# Patient Record
Sex: MALE | Race: WHITE | URBAN - METROPOLITAN AREA
[De-identification: names, ages, dates, MRNs, and addresses within clinical notes are randomized per-mention and may not be internally consistent; named-entity substitution may affect disease eponyms.]

---

## 2019-11-25 ENCOUNTER — OP HISTORICAL/CONVERTED ENCOUNTER (OUTPATIENT)
Dept: OTHER | Age: 64
End: 2019-11-25

## 2025-01-02 ENCOUNTER — OFFICE VISIT (OUTPATIENT)
Age: 70
End: 2025-01-02

## 2025-01-02 VITALS
HEART RATE: 60 BPM | DIASTOLIC BLOOD PRESSURE: 70 MMHG | SYSTOLIC BLOOD PRESSURE: 128 MMHG | RESPIRATION RATE: 14 BRPM | HEIGHT: 69 IN | OXYGEN SATURATION: 97 % | BODY MASS INDEX: 26.48 KG/M2 | WEIGHT: 178.8 LBS

## 2025-01-02 DIAGNOSIS — R09.82 POST-NASAL DRAINAGE: Primary | ICD-10-CM

## 2025-01-02 DIAGNOSIS — R09.81 NASAL CONGESTION: ICD-10-CM

## 2025-01-02 DIAGNOSIS — H73.892 RETRACTION OF TYMPANIC MEMBRANE, LEFT: ICD-10-CM

## 2025-01-02 RX ORDER — ATORVASTATIN CALCIUM 40 MG/1
TABLET, FILM COATED ORAL
COMMUNITY

## 2025-01-02 RX ORDER — LISINOPRIL 20 MG/1
TABLET ORAL
COMMUNITY
Start: 2024-06-03

## 2025-01-02 RX ORDER — IPRATROPIUM BROMIDE 21 UG/1
2 SPRAY, METERED NASAL EVERY 12 HOURS
Qty: 30 ML | Refills: 3 | Status: SHIPPED | OUTPATIENT
Start: 2025-01-02

## 2025-01-02 NOTE — PROGRESS NOTES
Otolaryngology-Head and Neck Surgery  New Patient Visit     Patient: Cole Ricketts  YOB: 1955  MRN: [id number]  Date of Service: 1/2/2025    Chief Complaint:   Chief Complaint   Patient presents with    New Patient    Sinus Problem     Sinus infection         History of Present Illness: Cole Ricketts is a 69 y.o. male who presents today for discussion of ongoing sinus issues.     Chronic post nasal drip, ongoing for the last couple of years  Constant, regardless of season, no specific trigger  Worse in the afternoon  Nasal congestion intermittent   Triggers cough in AM     ? Possibly related to norovirus, with persistent nasal drip thereafter    Tried antibiotics  -Augmentin, no improvement  Has tried OTC antihistamines  Uses sinus rinses   Gargles salt water     Has tried Flonase, Nasacort and now more recently olopatadine nasal spray    Past Medical History:  History reviewed. No pertinent past medical history.    Past Surgical History:   History reviewed. No pertinent surgical history.    Medications:   Current Outpatient Medications   Medication Instructions    apixaban (ELIQUIS) 5 mg, Oral    atorvastatin (LIPITOR) 40 MG tablet 40 mg  , TAKE 1 TABLET AT BEDTIME    lisinopril (PRINIVIL;ZESTRIL) 20 MG tablet Oral    Multiple Vitamins-Minerals (ICAPS AREDS 2 PO) Oral, 2 TIMES DAILY       Allergies:   No Known Allergies    Social History:         Family History:  History reviewed. No pertinent family history.    Review of Systems:    Consitutional: denies fever, excessive weight gain or loss.  Eyes: denies diplopia, eye pain.  Integumentary: denies new concerning skin lesions.  Ears, Nose, Mouth, Throat: denies except as per HPI.  Endocrine: denies hot or cold intolerance, increased thirst.  Respiratory: denies cough, hemoptysis, wheezing  Gastrointestinal: denies trouble swallowing, nausea, emesis, regurgitation  Musculoskeletal: denies muscle weakness or wasting  Cardiovascular: denies chest

## 2025-01-20 RX ORDER — IPRATROPIUM BROMIDE 21 UG/1
1 SPRAY, METERED NASAL 2 TIMES DAILY PRN
Qty: 1 EACH | Refills: 1 | Status: SHIPPED | OUTPATIENT
Start: 2025-01-20

## 2025-01-28 DIAGNOSIS — J32.4 CHRONIC PANSINUSITIS: Primary | ICD-10-CM

## 2025-01-28 RX ORDER — DOXYCYCLINE HYCLATE 100 MG
100 TABLET ORAL 2 TIMES DAILY
Qty: 20 TABLET | Refills: 0 | Status: SHIPPED | OUTPATIENT
Start: 2025-01-28 | End: 2025-02-07

## 2025-02-17 ENCOUNTER — OFFICE VISIT (OUTPATIENT)
Age: 70
End: 2025-02-17
Payer: MEDICARE

## 2025-02-17 VITALS
BODY MASS INDEX: 26.4 KG/M2 | RESPIRATION RATE: 16 BRPM | DIASTOLIC BLOOD PRESSURE: 72 MMHG | OXYGEN SATURATION: 98 % | HEIGHT: 69 IN | HEART RATE: 58 BPM | SYSTOLIC BLOOD PRESSURE: 110 MMHG

## 2025-02-17 DIAGNOSIS — R09.82 POST-NASAL DRAINAGE: ICD-10-CM

## 2025-02-17 DIAGNOSIS — R09.82 POST-NASAL DRAINAGE: Primary | ICD-10-CM

## 2025-02-17 PROCEDURE — 1159F MED LIST DOCD IN RCRD: CPT | Performed by: OTOLARYNGOLOGY

## 2025-02-17 PROCEDURE — 1123F ACP DISCUSS/DSCN MKR DOCD: CPT | Performed by: OTOLARYNGOLOGY

## 2025-02-17 PROCEDURE — G8419 CALC BMI OUT NRM PARAM NOF/U: HCPCS | Performed by: OTOLARYNGOLOGY

## 2025-02-17 PROCEDURE — G8427 DOCREV CUR MEDS BY ELIG CLIN: HCPCS | Performed by: OTOLARYNGOLOGY

## 2025-02-17 PROCEDURE — 1036F TOBACCO NON-USER: CPT | Performed by: OTOLARYNGOLOGY

## 2025-02-17 PROCEDURE — 99213 OFFICE O/P EST LOW 20 MIN: CPT | Performed by: OTOLARYNGOLOGY

## 2025-02-17 PROCEDURE — 3017F COLORECTAL CA SCREEN DOC REV: CPT | Performed by: OTOLARYNGOLOGY

## 2025-02-17 NOTE — PROGRESS NOTES
Otolaryngology-Head and Neck Surgery  Follow Up Patient Visit     Patient: Cole Ricketts  YOB: 1955  MRN: [id number]  Date of Service: 2/17/2025    Chief Complaint:   Chief Complaint   Patient presents with    Follow-up     Post-nasal drainage     Interval hx   Recent AVR and pacemaker    Perhaps atrovent helped slightly with nasal drip  Had exacerbation of symptoms - with discoloration, dysosmia, improved after augmentin Rx    History of Present Illness: Cole Ricketts is a 69 y.o. male who presents today for discussion of ongoing sinus issues.     Chronic post nasal drip, ongoing for the last couple of years  Constant, regardless of season, no specific trigger  Worse in the afternoon  Nasal congestion intermittent   Triggers cough in AM     ? Possibly related to norovirus, with persistent nasal drip thereafter    Tried antibiotics  -Augmentin, no improvement  Has tried OTC antihistamines  Uses sinus rinses   Gargles salt water     Has tried Flonase, Nasacort and now more recently olopatadine nasal spray    Past Medical History:  Past Medical History:   Diagnosis Date    Arthritis 6/18/2000    neck, knee, hands, back       Past Surgical History:   History reviewed. No pertinent surgical history.    Medications:   Current Outpatient Medications   Medication Instructions    apixaban (ELIQUIS) 5 mg, Oral    atorvastatin (LIPITOR) 40 MG tablet 40 mg  , TAKE 1 TABLET AT BEDTIME    ipratropium (ATROVENT) 0.03 % nasal spray 1 spray, Nasal, 2 TIMES DAILY PRN    lisinopril (PRINIVIL;ZESTRIL) 20 MG tablet Oral    Multiple Vitamins-Minerals (ICAPS AREDS 2 PO) Oral, 2 TIMES DAILY       Allergies:   No Known Allergies    Social History:   Social History     Tobacco Use    Smoking status: Never    Smokeless tobacco: Never   Substance Use Topics    Alcohol use: Yes     Alcohol/week: 5.0 standard drinks of alcohol     Types: 5 Cans of beer per week    Drug use: Yes     Types: Marijuana (Weed)     Comment: ingest

## 2025-02-19 LAB — SPECIMEN STATUS REPORT: NORMAL

## 2025-02-20 RX ORDER — IPRATROPIUM BROMIDE 21 UG/1
SPRAY, METERED NASAL
Refills: 0 | OUTPATIENT
Start: 2025-02-20

## 2025-02-21 LAB
BACTERIA SPEC RESP CULT: ABNORMAL
BACTERIA SPEC RESP CULT: ABNORMAL

## 2025-02-25 RX ORDER — CEFDINIR 300 MG/1
300 CAPSULE ORAL 2 TIMES DAILY
Qty: 20 CAPSULE | Refills: 0 | Status: SHIPPED | OUTPATIENT
Start: 2025-02-25 | End: 2025-03-07

## 2025-03-27 ENCOUNTER — HOSPITAL ENCOUNTER (OUTPATIENT)
Facility: HOSPITAL | Age: 70
Discharge: HOME | End: 2025-03-30
Attending: OTOLARYNGOLOGY
Payer: MEDICARE

## 2025-03-27 DIAGNOSIS — J32.4 CHRONIC PANSINUSITIS: ICD-10-CM

## 2025-03-27 PROCEDURE — 70486 CT MAXILLOFACIAL W/O DYE: CPT

## 2025-04-15 ENCOUNTER — OFFICE VISIT (OUTPATIENT)
Age: 70
End: 2025-04-15
Payer: MEDICARE

## 2025-04-15 VITALS
HEIGHT: 69 IN | HEART RATE: 60 BPM | OXYGEN SATURATION: 98 % | WEIGHT: 181.8 LBS | SYSTOLIC BLOOD PRESSURE: 112 MMHG | DIASTOLIC BLOOD PRESSURE: 68 MMHG | BODY MASS INDEX: 26.93 KG/M2 | RESPIRATION RATE: 16 BRPM

## 2025-04-15 DIAGNOSIS — R09.82 POSTNASAL DRIP: ICD-10-CM

## 2025-04-15 DIAGNOSIS — J34.2 NASAL SEPTAL DEVIATION: Primary | ICD-10-CM

## 2025-04-15 DIAGNOSIS — J30.9 ALLERGIC RHINITIS, UNSPECIFIED SEASONALITY, UNSPECIFIED TRIGGER: ICD-10-CM

## 2025-04-15 DIAGNOSIS — J32.0 CHRONIC MAXILLARY SINUSITIS: ICD-10-CM

## 2025-04-15 PROCEDURE — 99213 OFFICE O/P EST LOW 20 MIN: CPT | Performed by: OTOLARYNGOLOGY

## 2025-04-15 PROCEDURE — 1036F TOBACCO NON-USER: CPT | Performed by: OTOLARYNGOLOGY

## 2025-04-15 PROCEDURE — G8427 DOCREV CUR MEDS BY ELIG CLIN: HCPCS | Performed by: OTOLARYNGOLOGY

## 2025-04-15 PROCEDURE — 3017F COLORECTAL CA SCREEN DOC REV: CPT | Performed by: OTOLARYNGOLOGY

## 2025-04-15 PROCEDURE — 1159F MED LIST DOCD IN RCRD: CPT | Performed by: OTOLARYNGOLOGY

## 2025-04-15 PROCEDURE — 1123F ACP DISCUSS/DSCN MKR DOCD: CPT | Performed by: OTOLARYNGOLOGY

## 2025-04-15 PROCEDURE — G8419 CALC BMI OUT NRM PARAM NOF/U: HCPCS | Performed by: OTOLARYNGOLOGY

## 2025-04-15 RX ORDER — OLOPATADINE HYDROCHLORIDE AND MOMETASONE FUROATE 25; 665 UG/1; UG/1
SPRAY, METERED NASAL
COMMUNITY
Start: 2024-06-03

## 2025-04-15 NOTE — PROGRESS NOTES
Otolaryngology-Head and Neck Surgery  Follow Up Patient Visit     Patient: Cole Ricketts  YOB: 1955  MRN: [id number]  Date of Service: 4/15/2025    Chief Complaint:   Chief Complaint   Patient presents with    Follow-up    Sinus Problem     CT Results     Interval hx 4/15/2025  Had sinus CT     Interval hx   Recent AVR and pacemaker    Perhaps atrovent helped slightly with nasal drip  Had exacerbation of symptoms - with discoloration, dysosmia, improved after augmentin Rx    History of Present Illness: Cole Ricketts is a 69 y.o. male who presents today for discussion of ongoing sinus issues.     Chronic post nasal drip, ongoing for the last couple of years  Constant, regardless of season, no specific trigger  Worse in the afternoon  Nasal congestion intermittent   Triggers cough in AM     ? Possibly related to norovirus, with persistent nasal drip thereafter    Tried antibiotics  -Augmentin, no improvement  Has tried OTC antihistamines  Uses sinus rinses   Gargles salt water     Has tried Flonase, Nasacort and now more recently olopatadine nasal spray    Past Medical History:  Past Medical History:   Diagnosis Date    Arthritis 6/18/2000    neck, knee, hands, back       Past Surgical History:   History reviewed. No pertinent surgical history.    Medications:   Current Outpatient Medications   Medication Instructions    apixaban (ELIQUIS) 5 mg, Oral    atorvastatin (LIPITOR) 40 MG tablet 40 mg  , TAKE 1 TABLET AT BEDTIME    ipratropium (ATROVENT) 0.03 % nasal spray 1 spray, Nasal, 2 TIMES DAILY PRN    lisinopril (PRINIVIL;ZESTRIL) 20 MG tablet Oral    Multiple Vitamins-Minerals (ICAPS AREDS 2 PO) Oral, 2 TIMES DAILY       Allergies:   No Known Allergies    Social History:   Social History     Tobacco Use    Smoking status: Never    Smokeless tobacco: Never   Substance Use Topics    Alcohol use: Yes     Alcohol/week: 5.0 standard drinks of alcohol     Types: 5 Cans of beer per week    Drug use: Yes

## 2025-06-03 ENCOUNTER — CLINICAL SUPPORT (OUTPATIENT)
Age: 70
End: 2025-06-03
Payer: MEDICARE

## 2025-06-03 ENCOUNTER — OFFICE VISIT (OUTPATIENT)
Age: 70
End: 2025-06-03
Payer: MEDICARE

## 2025-06-03 VITALS — SYSTOLIC BLOOD PRESSURE: 150 MMHG | DIASTOLIC BLOOD PRESSURE: 80 MMHG | HEART RATE: 60 BPM | OXYGEN SATURATION: 99 %

## 2025-06-03 VITALS — OXYGEN SATURATION: 99 % | DIASTOLIC BLOOD PRESSURE: 82 MMHG | HEART RATE: 60 BPM | SYSTOLIC BLOOD PRESSURE: 150 MMHG

## 2025-06-03 DIAGNOSIS — J30.89 ALLERGIC RHINITIS DUE TO OTHER ALLERGIC TRIGGER, UNSPECIFIED SEASONALITY: ICD-10-CM

## 2025-06-03 DIAGNOSIS — J34.2 NASAL SEPTAL DEVIATION: ICD-10-CM

## 2025-06-03 DIAGNOSIS — R09.81 NASAL CONGESTION: ICD-10-CM

## 2025-06-03 DIAGNOSIS — J30.9 ALLERGIC RHINITIS, UNSPECIFIED SEASONALITY, UNSPECIFIED TRIGGER: Primary | ICD-10-CM

## 2025-06-03 DIAGNOSIS — J32.0 CHRONIC MAXILLARY SINUSITIS: ICD-10-CM

## 2025-06-03 PROCEDURE — G8419 CALC BMI OUT NRM PARAM NOF/U: HCPCS | Performed by: OTOLARYNGOLOGY

## 2025-06-03 PROCEDURE — 99214 OFFICE O/P EST MOD 30 MIN: CPT | Performed by: OTOLARYNGOLOGY

## 2025-06-03 PROCEDURE — 1126F AMNT PAIN NOTED NONE PRSNT: CPT | Performed by: OTOLARYNGOLOGY

## 2025-06-03 PROCEDURE — 95004 PERQ TESTS W/ALRGNC XTRCS: CPT | Performed by: OTOLARYNGOLOGY

## 2025-06-03 PROCEDURE — 95024 IQ TESTS W/ALLERGENIC XTRCS: CPT | Performed by: OTOLARYNGOLOGY

## 2025-06-03 PROCEDURE — 1123F ACP DISCUSS/DSCN MKR DOCD: CPT | Performed by: OTOLARYNGOLOGY

## 2025-06-03 PROCEDURE — 3017F COLORECTAL CA SCREEN DOC REV: CPT | Performed by: OTOLARYNGOLOGY

## 2025-06-03 PROCEDURE — 1036F TOBACCO NON-USER: CPT | Performed by: OTOLARYNGOLOGY

## 2025-06-03 PROCEDURE — G8427 DOCREV CUR MEDS BY ELIG CLIN: HCPCS | Performed by: OTOLARYNGOLOGY

## 2025-06-03 RX ORDER — MONTELUKAST SODIUM 10 MG/1
10 TABLET ORAL DAILY
Qty: 90 TABLET | Refills: 2 | Status: SHIPPED | OUTPATIENT
Start: 2025-06-03

## 2025-06-04 NOTE — PROGRESS NOTES
Otolaryngology-Head and Neck Surgery  Follow Up Patient Visit     Patient: Cole Ricketts  YOB: 1955  MRN: [id number]  Date of Service: 6/4/2025    Chief Complaint:   Chief Complaint   Patient presents with    Follow-up     AT FU      Interval hx 6/4/2025  Had sinus CT     Interval hx   Recent AVR and pacemaker    Perhaps atrovent helped slightly with nasal drip  Had exacerbation of symptoms - with discoloration, dysosmia, improved after augmentin Rx    History of Present Illness: Cole Ricketts is a 70 y.o. male who presents today for discussion of ongoing sinus issues.     Chronic post nasal drip, ongoing for the last couple of years  Constant, regardless of season, no specific trigger  Worse in the afternoon  Nasal congestion intermittent   Triggers cough in AM     ? Possibly related to norovirus, with persistent nasal drip thereafter    Tried antibiotics  -Augmentin, no improvement  Has tried OTC antihistamines  Uses sinus rinses   Gargles salt water     Has tried Flonase, Nasacort and now more recently olopatadine nasal spray    Past Medical History:  Past Medical History:   Diagnosis Date    Arthritis 6/18/2000    neck, knee, hands, back       Past Surgical History:   History reviewed. No pertinent surgical history.    Medications:   Current Outpatient Medications   Medication Instructions    apixaban (ELIQUIS) 5 mg    atorvastatin (LIPITOR) 40 MG tablet 40 mg  , TAKE 1 TABLET AT BEDTIME    ipratropium (ATROVENT) 0.03 % nasal spray 1 spray, Nasal, 2 TIMES DAILY PRN    lisinopril (PRINIVIL;ZESTRIL) 20 MG tablet Take by mouth    montelukast (SINGULAIR) 10 mg, Oral, DAILY    Multiple Vitamins-Minerals (ICAPS AREDS 2 PO) 2 TIMES DAILY    Olopatadine-Mometasone (RYALTRIS) 665-25 MCG/ACT SUSP Inhale 4 sprays into nostril 2 times per day (2 sprays in each nostril)       Allergies:   No Known Allergies    Social History:   Social History     Tobacco Use    Smoking status: Never    Smokeless tobacco:

## 2025-07-28 RX ORDER — MONTELUKAST SODIUM 10 MG/1
10 TABLET ORAL NIGHTLY
Qty: 90 TABLET | Refills: 1 | Status: SHIPPED | OUTPATIENT
Start: 2025-07-28